# Patient Record
Sex: MALE | Race: WHITE | NOT HISPANIC OR LATINO | URBAN - METROPOLITAN AREA
[De-identification: names, ages, dates, MRNs, and addresses within clinical notes are randomized per-mention and may not be internally consistent; named-entity substitution may affect disease eponyms.]

---

## 2019-09-24 ENCOUNTER — OFFICE VISIT - HEALTHEAST (OUTPATIENT)
Dept: FAMILY MEDICINE | Facility: CLINIC | Age: 84
End: 2019-09-24

## 2019-09-24 DIAGNOSIS — K59.00 CONSTIPATION, UNSPECIFIED CONSTIPATION TYPE: ICD-10-CM

## 2019-09-24 DIAGNOSIS — L60.2 ONYCHAUXIS: ICD-10-CM

## 2019-09-24 DIAGNOSIS — E11.8 TYPE 2 DIABETES MELLITUS WITH COMPLICATION, WITHOUT LONG-TERM CURRENT USE OF INSULIN (H): ICD-10-CM

## 2019-09-24 DIAGNOSIS — C41.0: ICD-10-CM

## 2019-09-24 DIAGNOSIS — M79.673 PAIN OF FOOT, UNSPECIFIED LATERALITY: ICD-10-CM

## 2019-09-24 DIAGNOSIS — Z23 NEEDS FLU SHOT: ICD-10-CM

## 2019-09-24 ASSESSMENT — MIFFLIN-ST. JEOR: SCORE: 1388.24

## 2019-12-10 ENCOUNTER — RECORDS - HEALTHEAST (OUTPATIENT)
Dept: ADMINISTRATIVE | Facility: OTHER | Age: 84
End: 2019-12-10

## 2021-05-27 ENCOUNTER — RECORDS - HEALTHEAST (OUTPATIENT)
Dept: ADMINISTRATIVE | Facility: CLINIC | Age: 86
End: 2021-05-27

## 2021-06-01 NOTE — PROGRESS NOTES
"Chief Complaint   Patient presents with     Nail Care     Toenail trim.      HPI: Patient presents today for toenail trim.  He has a history of type 2 diabetes, constipation, and facial bone cancer.  While [facility], the patient notes that he had a fall due to a hypoglycemic episode and a CT scan of the head showed facial bone cancer for which he has been receiving radiation therapy.  He is having side effects including appetite suppression, weight loss, and decreased sense of taste.  He says that he was told he is not \"strong enough\" for chemotherapy.  Surgical removal with placement of a plastic plate was discussed, but patient does not have any interest in going forward this given his age.  He finds that he is quite fatigued and notes increased instability with walking since starting the radiation therapy.    The constipation is his primary concern.  He was given a prescription for Senokot which he uses every 1-3 days.  He finds that he will have difficulty getting stool out because it is hard.  No abdominal pain or overflow diarrhea.    Patient's toenails have become long, thick, and painful.    The patient spends 10 months a year down in [place] and will be leaving in October.    ROS:Review of Systems - negative except for what's listed in the HPI    SH: The Patient's  reports that he has quit smoking. He smoked 0.00 packs per day. He has never used smokeless tobacco.      FH: The Patient's family history is not on file.     Meds:    No current outpatient medications on file prior to visit.     No current facility-administered medications on file prior to visit.        O:  /76   Pulse 93   Ht 5' 7.25\" (1.708 m)   Wt 171 lb (77.6 kg)   SpO2 98%   BMI 26.58 kg/m      Physical Examination:   General appearance - alert, well appearing, and in no distress  Mental status - alert, oriented to person, place, and time  Lymphatics - no palpable lymphadenopathy, no " hepatosplenomegaly  Chest - clear to auscultation, no wheezes, rales or rhonchi, symmetric air entry  Heart - normal rate and regular rhythm, S1 and S2 normal, no murmurs noted  Abdomen - soft, nontender, nondistended, no masses or organomegaly  Neurological - alert, oriented, normal speech, no focal findings or movement disorder noted, neck supple without rigidity, cranial nerves II through XII intact, sensory grossly normal bilaterally, normal muscle tone, no tremors, strength 5/5  Musculoskeletal -significant instability with going from sitting to standing position.  Utilizing a walker.  Extremities - peripheral pulses normal, no pedal edema, no clubbing or cyanosis  Skin -toenails are long, thick, and discolored.  There is dystrophic changes throughout all of them.  Pedal hair absent.  Skin is shiny, but intact.      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    2. Type 2 diabetes mellitus with complication, without long-term current use of insulin (H)  Reports that this is been well controlled with lifestyle changes.  He reports he is been taking all of his antihyperglycemic's    3. Onychauxis  Toenail debridement X 10.    4. Pain of foot, unspecified laterality    5.  Constipation  Intermittent constipation secondary to decreased oral intake and lower physical activity.  Encouraged fluids, increased activity, and trying over-the-counter MiraLAX.    6. Needs flu shot  - Influenza High Dose, Seasonal 65+ yrs    He will most likely [facility]  here in the [facility] and down in [place].  He leaves in approximately 1 month to go back to [place] for 10 months.  I told the patient he can let me know if he needs thing    Frandy Perkins, CNP

## 2021-06-01 NOTE — PATIENT INSTRUCTIONS - HE
You can try to add in miralax when you're getting constipated. It doesn't work as quickly as the senna, but taken daily it will help soften up the stool to make it easier to get the stool out.

## 2021-06-03 VITALS
SYSTOLIC BLOOD PRESSURE: 118 MMHG | BODY MASS INDEX: 26.84 KG/M2 | HEIGHT: 67 IN | OXYGEN SATURATION: 98 % | WEIGHT: 171 LBS | DIASTOLIC BLOOD PRESSURE: 76 MMHG | HEART RATE: 93 BPM